# Patient Record
Sex: MALE | Race: WHITE
[De-identification: names, ages, dates, MRNs, and addresses within clinical notes are randomized per-mention and may not be internally consistent; named-entity substitution may affect disease eponyms.]

---

## 2023-07-14 ENCOUNTER — HOSPITAL ENCOUNTER (INPATIENT)
Dept: HOSPITAL 95 - SURS | Age: 73
LOS: 1 days | Discharge: HOME | DRG: 483 | End: 2023-07-15
Payer: COMMERCIAL

## 2023-07-14 VITALS — SYSTOLIC BLOOD PRESSURE: 112 MMHG | DIASTOLIC BLOOD PRESSURE: 67 MMHG

## 2023-07-14 VITALS — SYSTOLIC BLOOD PRESSURE: 129 MMHG | DIASTOLIC BLOOD PRESSURE: 82 MMHG

## 2023-07-14 VITALS — DIASTOLIC BLOOD PRESSURE: 53 MMHG | SYSTOLIC BLOOD PRESSURE: 83 MMHG

## 2023-07-14 VITALS — DIASTOLIC BLOOD PRESSURE: 84 MMHG | SYSTOLIC BLOOD PRESSURE: 131 MMHG

## 2023-07-14 VITALS — SYSTOLIC BLOOD PRESSURE: 95 MMHG | DIASTOLIC BLOOD PRESSURE: 43 MMHG

## 2023-07-14 VITALS — SYSTOLIC BLOOD PRESSURE: 76 MMHG | DIASTOLIC BLOOD PRESSURE: 41 MMHG

## 2023-07-14 VITALS — SYSTOLIC BLOOD PRESSURE: 89 MMHG | DIASTOLIC BLOOD PRESSURE: 45 MMHG

## 2023-07-14 VITALS — SYSTOLIC BLOOD PRESSURE: 99 MMHG | DIASTOLIC BLOOD PRESSURE: 41 MMHG

## 2023-07-14 VITALS — WEIGHT: 228.18 LBS | HEIGHT: 69 IN | BODY MASS INDEX: 33.8 KG/M2

## 2023-07-14 VITALS — DIASTOLIC BLOOD PRESSURE: 38 MMHG | SYSTOLIC BLOOD PRESSURE: 88 MMHG

## 2023-07-14 VITALS — SYSTOLIC BLOOD PRESSURE: 98 MMHG | DIASTOLIC BLOOD PRESSURE: 43 MMHG

## 2023-07-14 VITALS — SYSTOLIC BLOOD PRESSURE: 96 MMHG | DIASTOLIC BLOOD PRESSURE: 48 MMHG

## 2023-07-14 VITALS — SYSTOLIC BLOOD PRESSURE: 92 MMHG | DIASTOLIC BLOOD PRESSURE: 40 MMHG

## 2023-07-14 VITALS — DIASTOLIC BLOOD PRESSURE: 79 MMHG | SYSTOLIC BLOOD PRESSURE: 127 MMHG

## 2023-07-14 VITALS — SYSTOLIC BLOOD PRESSURE: 119 MMHG | DIASTOLIC BLOOD PRESSURE: 70 MMHG

## 2023-07-14 VITALS — SYSTOLIC BLOOD PRESSURE: 128 MMHG | DIASTOLIC BLOOD PRESSURE: 81 MMHG

## 2023-07-14 VITALS — SYSTOLIC BLOOD PRESSURE: 139 MMHG | DIASTOLIC BLOOD PRESSURE: 77 MMHG

## 2023-07-14 VITALS — DIASTOLIC BLOOD PRESSURE: 61 MMHG | SYSTOLIC BLOOD PRESSURE: 85 MMHG

## 2023-07-14 VITALS — DIASTOLIC BLOOD PRESSURE: 82 MMHG | SYSTOLIC BLOOD PRESSURE: 117 MMHG

## 2023-07-14 VITALS — DIASTOLIC BLOOD PRESSURE: 86 MMHG | SYSTOLIC BLOOD PRESSURE: 122 MMHG

## 2023-07-14 VITALS — DIASTOLIC BLOOD PRESSURE: 84 MMHG | SYSTOLIC BLOOD PRESSURE: 137 MMHG

## 2023-07-14 VITALS — DIASTOLIC BLOOD PRESSURE: 77 MMHG | SYSTOLIC BLOOD PRESSURE: 131 MMHG

## 2023-07-14 VITALS — DIASTOLIC BLOOD PRESSURE: 52 MMHG | SYSTOLIC BLOOD PRESSURE: 86 MMHG

## 2023-07-14 VITALS — DIASTOLIC BLOOD PRESSURE: 49 MMHG | SYSTOLIC BLOOD PRESSURE: 96 MMHG

## 2023-07-14 DIAGNOSIS — E11.9: ICD-10-CM

## 2023-07-14 DIAGNOSIS — Z79.84: ICD-10-CM

## 2023-07-14 DIAGNOSIS — M19.012: ICD-10-CM

## 2023-07-14 DIAGNOSIS — Z98.890: ICD-10-CM

## 2023-07-14 DIAGNOSIS — Z79.811: ICD-10-CM

## 2023-07-14 DIAGNOSIS — Z90.89: ICD-10-CM

## 2023-07-14 DIAGNOSIS — Z79.52: ICD-10-CM

## 2023-07-14 DIAGNOSIS — Z79.899: ICD-10-CM

## 2023-07-14 DIAGNOSIS — S46.012A: Primary | ICD-10-CM

## 2023-07-14 DIAGNOSIS — F10.90: ICD-10-CM

## 2023-07-14 DIAGNOSIS — I10: ICD-10-CM

## 2023-07-14 DIAGNOSIS — Z79.891: ICD-10-CM

## 2023-07-14 PROCEDURE — C1713 ANCHOR/SCREW BN/BN,TIS/BN: HCPCS

## 2023-07-14 PROCEDURE — 0RRK00Z REPLACEMENT OF LEFT SHOULDER JOINT WITH REVERSE BALL AND SOCKET SYNTHETIC SUBSTITUTE, OPEN APPROACH: ICD-10-PCS

## 2023-07-14 PROCEDURE — A9270 NON-COVERED ITEM OR SERVICE: HCPCS

## 2023-07-14 PROCEDURE — C1776 JOINT DEVICE (IMPLANTABLE): HCPCS

## 2023-07-14 NOTE — NUR
CARE ASSUMPTION
 
patient arrived from [facility] while this rn was at lunch. patient vital signs
stable. spo2 >95% on room air. patient is alert and oriented x4. patient is
able to make needs known. wife is at bedside. patient reports no pain,
shortness of breath, or chest pain/pressure. patient is post op left shoulder
surgery. patient has dressing in place, and dressing orders, see orders.
patient has a ice pack to site. patient will work with therapies today, and
plan is to discharge home tomorrow. see admit assessment for further detials.
admit is complete. plan of care is up to date.

## 2023-07-14 NOTE — NUR
shift summary
 
patient neuro remains unchanged. patient worked with both physical therapy and
occupational therapy, see their assessment for further detials. patient is a
stand by assist. patient has ice pack to left shoulder and arm remains in
sling. no acute changes. plan for discharge tomorrow. call light within reach.

## 2023-07-14 NOTE — NUR
ARRIVED FROM PACU VIA BED, AWAKE, A&OX4, DENIES ANY PAIN, L ARM IN SLING,
ORIENTED TO ROOM, CALL LIGHT WITHIN REACH.

## 2023-07-15 VITALS — DIASTOLIC BLOOD PRESSURE: 81 MMHG | SYSTOLIC BLOOD PRESSURE: 133 MMHG

## 2023-07-15 VITALS — DIASTOLIC BLOOD PRESSURE: 58 MMHG | SYSTOLIC BLOOD PRESSURE: 139 MMHG

## 2023-07-15 LAB
ANION GAP SERPL CALCULATED.4IONS-SCNC: 9 MMOL/L (ref 6–16)
BASOPHILS # BLD AUTO: 0.01 K/MM3 (ref 0–0.23)
BASOPHILS NFR BLD AUTO: 0 % (ref 0–2)
BUN SERPL-MCNC: 28 MG/DL (ref 8–24)
CALCIUM SERPL-MCNC: 8.9 MG/DL (ref 8.5–10.1)
CHLORIDE SERPL-SCNC: 101 MMOL/L (ref 98–108)
CO2 SERPL-SCNC: 26 MMOL/L (ref 21–32)
CREAT SERPL-MCNC: 1.14 MG/DL (ref 0.6–1.2)
DEPRECATED RDW RBC AUTO: 42.1 FL (ref 35.1–46.3)
EOSINOPHIL # BLD AUTO: 0 K/MM3 (ref 0–0.68)
EOSINOPHIL NFR BLD AUTO: 0 % (ref 0–6)
ERYTHROCYTE [DISTWIDTH] IN BLOOD BY AUTOMATED COUNT: 12.4 % (ref 11.7–14.2)
GLUCOSE SERPL-MCNC: 169 MG/DL (ref 70–99)
HCT VFR BLD AUTO: 40.6 % (ref 37–53)
HGB BLD-MCNC: 13.6 G/DL (ref 13.5–17.5)
IMM GRANULOCYTES # BLD AUTO: 0.09 K/MM3 (ref 0–0.1)
IMM GRANULOCYTES NFR BLD AUTO: 1 % (ref 0–1)
LYMPHOCYTES # BLD AUTO: 0.81 K/MM3 (ref 0.84–5.2)
LYMPHOCYTES NFR BLD AUTO: 4 % (ref 21–46)
MAGNESIUM SERPL-MCNC: 1.9 MG/DL (ref 1.6–2.4)
MCHC RBC AUTO-ENTMCNC: 33.5 G/DL (ref 31.5–36.5)
MCV RBC AUTO: 92 FL (ref 80–100)
MONOCYTES # BLD AUTO: 1.1 K/MM3 (ref 0.16–1.47)
MONOCYTES NFR BLD AUTO: 6 % (ref 4–13)
NEUTROPHILS # BLD AUTO: 16.38 K/MM3 (ref 1.96–9.15)
NEUTROPHILS NFR BLD AUTO: 89 % (ref 41–73)
NRBC # BLD AUTO: 0 K/MM3 (ref 0–0.02)
NRBC BLD AUTO-RTO: 0 /100 WBC (ref 0–0.2)
PLATELET # BLD AUTO: 271 K/MM3 (ref 150–400)
POTASSIUM SERPL-SCNC: 5.1 MMOL/L (ref 3.5–5.5)
SODIUM SERPL-SCNC: 136 MMOL/L (ref 136–145)

## 2023-07-15 NOTE — NUR
DISCHARGE
POD 1 LTSA
PT PAIN WELL CONTROLLED PER EMAR. WEARING SLING T/O SHIFT. ASKING QUESTIONS
ABOUT CARE AND SLING USAGE DURING SHIFT. PT EDUCATED AND RECEPTIVE TO
EDUCATION. PRESCRIPTION SENT WITH SPOUSE, PICKED UP PRIOR TO DISCHARGE. ALL
BELONGINGS SENT WITH PATIENT. EXTRA AQUACEL DRESSINGS SENT. NO FURTHER
QUESTIONS AT THIS TIME. ESCORTED OUT WITH WHEELCHAIR.

## 2023-07-15 NOTE — NUR
SHIFT SUMMARY
AOX4. VSS. POD 1-L TOTAL SHOULDER ARTHROPLASTY. REPORTS 1-3/10 PAIN IN L
SHOULDER/ARM, MEDICATED c SCHEDULED TYLENOL & TORADOL- PT REPORTS PAIN RELIEF.
LUCW c CLEAR BANDAGE OVER SURGICAL SITE. ABLE TO MOVE L HAND FINGERS, STATES
HE IS UNABLE TO ROTATE OR FLEX L WRIST & HAS N/T TO L HAND FINGERS. L ARM IN
SLING. PT UP IN RECLINER T/O NIGHT & HASNT BEEN ABLE TO SLEEP. HS CBG @295, NO
COVERAGE NEEDED PER PARAMETERS. PLAN TO POSSIBLY DC HOME TODAY, CALL LIGHT IN
REACH.